# Patient Record
Sex: MALE | Race: WHITE | NOT HISPANIC OR LATINO | URBAN - METROPOLITAN AREA
[De-identification: names, ages, dates, MRNs, and addresses within clinical notes are randomized per-mention and may not be internally consistent; named-entity substitution may affect disease eponyms.]

---

## 2017-03-24 ENCOUNTER — APPOINTMENT (OUTPATIENT)
Age: 47
Setting detail: DERMATOLOGY
End: 2017-03-25

## 2017-03-24 PROBLEM — C44.91 BASAL CELL CARCINOMA OF SKIN, UNSPECIFIED: Status: ACTIVE | Noted: 2017-03-24

## 2017-03-24 PROCEDURE — OTHER MOHS SURGERY PHONE CONSULTATION: OTHER

## 2017-03-28 ENCOUNTER — APPOINTMENT (OUTPATIENT)
Age: 47
Setting detail: DERMATOLOGY
End: 2017-03-30

## 2017-03-28 PROBLEM — C44.311 BASAL CELL CARCINOMA OF SKIN OF NOSE: Status: ACTIVE | Noted: 2017-03-28

## 2017-03-28 PROBLEM — C44.91 BASAL CELL CARCINOMA OF SKIN, UNSPECIFIED: Status: ACTIVE | Noted: 2017-03-28

## 2017-03-28 PROCEDURE — 99202 OFFICE O/P NEW SF 15 MIN: CPT | Mod: 25

## 2017-03-28 PROCEDURE — OTHER MOHS SURGERY: OTHER

## 2017-03-28 PROCEDURE — 13151 CMPLX RPR E/N/E/L 1.1-2.5 CM: CPT

## 2017-03-28 PROCEDURE — 17311 MOHS 1 STAGE H/N/HF/G: CPT

## 2017-03-28 PROCEDURE — OTHER CONSULTATION FOR MOHS SURGERY: OTHER

## 2017-03-28 PROCEDURE — OTHER PRESCRIPTION: OTHER

## 2017-03-28 RX ORDER — GENTAMICIN SULFATE 0.3 %
OINTMENT (GRAM) OPHTHALMIC (EYE) QD
Qty: 3 | Refills: 1 | Status: ERX | COMMUNITY
Start: 2017-03-28

## 2017-03-28 NOTE — PROCEDURE: MOHS SURGERY
Post-Care Instructions: The patient was provided with detailed verbal and written instructions for daily wound care, including use of H2O2 cleansing, followed by application of plain Vaseline and a bandage.  These instructions including Dr. Han's contact information should there be any questions or concerns.  The patient is not to engage in any heavy lifting, exercise, or swimming for the next week.  Should the patient develop any fevers, chills, bleeding, or pain not controlled by OTC analgesics, s/he should contact the office immediately.

## 2017-03-28 NOTE — PROCEDURE: MOHS SURGERY
Body Location Override (Optional - Billing Will Still Be Based On Selected Body Map Location If Applicable): Left proximal nasal sidewall

## 2017-03-28 NOTE — PROCEDURE: CONSULTATION FOR MOHS SURGERY
Name Of The Referring Provider For Procedure: Digna Shaffer MD
Location Indication Override (Is Already Calculated Based On Selected Body Location): Area H
Incorporate Mauc In Note: Yes
Size Of Lesion: 0.6
Detail Level: Detailed
Body Location Override (Optional - Billing Will Still Be Based On Selected Body Map Location If Applicable): Left proximal nasal sidewall

## 2018-06-25 NOTE — PROCEDURE: MOHS SURGERY
Answered in another message   Consent 3/Introductory Paragraph: I gave the patient a chance to ask questions they had about the procedure.  Following this I explained the Mohs procedure and consent was obtained. The risks, benefits and alternatives to therapy were discussed in detail. Specifically, the risks of infection, scarring, bleeding, prolonged wound healing, incomplete removal, allergy to anesthesia, nerve injury and recurrence were addressed. Prior to the procedure, the treatment site was clearly identified and confirmed by the patient. All components of Universal Protocol/PAUSE Rule completed.

## 2023-02-22 NOTE — PROCEDURE: MOHS SURGERY
Quadrant Reporting?: no Pathology Comment (Optional): Because the differential diagnosis is morpheaform BCC and micro cystic adnexal carcinoma, this neoplasm should be removed completely, but conservatively.

## 2023-11-13 NOTE — PROCEDURE: MOHS SURGERY
11/13/23 Pt confirmed apts - jh    Complex Repair And Graft Additional Text (Will Appearing After The Standard Complex Repair Text): The complex repair was not sufficient to completely close the primary defect. The remaining additional defect was repaired with the graft mentioned below.

## 2023-12-18 NOTE — PROCEDURE: MOHS SURGERY PHONE CONSULTATION
Has The Patient Ever Been Seen In Our Practice For Mohs Surgery Before?: No
Detail Level: Simple
Patient Reported Location: L side of the bridge of the nose
Referring Provider: Digna Shaffer MD
Date Of Mohs Surgery: 03/28/2017
Date Of Surgical Consultation If Needed: 03/24/2017
Has The Pathology Report Been Received?: Yes
Office Location Of Mohs Surgery: [facility]
dysphoric/Euthymic